# Patient Record
Sex: MALE | Race: WHITE | NOT HISPANIC OR LATINO | Employment: FULL TIME | URBAN - METROPOLITAN AREA
[De-identification: names, ages, dates, MRNs, and addresses within clinical notes are randomized per-mention and may not be internally consistent; named-entity substitution may affect disease eponyms.]

---
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
## 2021-05-13 ENCOUNTER — OFFICE VISIT (OUTPATIENT)
Dept: GASTROENTEROLOGY | Facility: CLINIC | Age: 21
End: 2021-05-13
Payer: COMMERCIAL

## 2021-05-13 VITALS
WEIGHT: 145.2 LBS | SYSTOLIC BLOOD PRESSURE: 120 MMHG | HEIGHT: 71 IN | DIASTOLIC BLOOD PRESSURE: 62 MMHG | HEART RATE: 72 BPM | BODY MASS INDEX: 20.33 KG/M2

## 2021-05-13 DIAGNOSIS — R10.30 LOWER ABDOMINAL PAIN: ICD-10-CM

## 2021-05-13 DIAGNOSIS — R14.1 ABDOMINAL GAS PAIN: ICD-10-CM

## 2021-05-13 DIAGNOSIS — R19.7 DIARRHEA, UNSPECIFIED TYPE: Primary | ICD-10-CM

## 2021-05-13 PROCEDURE — 99203 OFFICE O/P NEW LOW 30 MIN: CPT | Performed by: PHYSICIAN ASSISTANT

## 2021-05-13 RX ORDER — PANTOPRAZOLE SODIUM 40 MG/1
40 TABLET, DELAYED RELEASE ORAL DAILY
Qty: 30 TABLET | Refills: 3 | Status: SHIPPED | OUTPATIENT
Start: 2021-05-13 | End: 2021-07-09

## 2021-05-13 RX ORDER — DIPHENOXYLATE HYDROCHLORIDE AND ATROPINE SULFATE 2.5; .025 MG/1; MG/1
1 TABLET ORAL 4 TIMES DAILY PRN
Qty: 30 TABLET | Refills: 0 | Status: SHIPPED | OUTPATIENT
Start: 2021-05-13 | End: 2021-07-09

## 2021-05-13 NOTE — PROGRESS NOTES
[facility] - Outpatient Consultation  Fernando Warren 22 y o  male MRN: [id number]  Encounter: [id number]          ASSESSMENT AND PLAN:      1  Diarrhea, unspecified type  2  Abdominal gas pain  3  Lower abdominal pain  -Will start patient on Lomotil as needed     -Will start pantoprazole 40 mg daily     -Will plan EGD and colonoscopy with biopsies     -Patient will continue high-fiber diet as well as fiber supplementation     -Patient will continue probiotics daily     -Patient have a follow-up appointment after EGD and colonoscopy   ______________________________________________________________________    HPI:    81-year-old male who presents to the office today for the evaluation of lower abdominal cramping, abdominal gas pain, belching, diarrhea  Patient reports that his the symptoms have been present for several months  Patient unfortunately had his wisdom teeth pulled last year and  Suffered from an ongoing infection for 4 months  Patient was on antibiotics for practically 4 months straight  Patient reports he will have 4-5 bowel movements a day  Patient reports lower abdominal cramping as well as sharp lower abdominal pains  He denies any rectal bleeding or melena he reports his weight is stable and his appetite is good  He is reporting a significant amount of abdominal gas, bloating, belching  Patient has tried over-the-counter Gas-X, Tums, Imodium, acidophilus  Patient does have a family history of irritable bowel syndrome  Patient denies any family history of ulcerative colitis, Crohn's disease  Patient has never had endoscopy or colonoscopy in the past   Patient has had stool testing to include C diff which was negative  REVIEW OF SYSTEMS:    CONSTITUTIONAL: Denies any fever, chills, rigors, and weight loss  HEENT: No earache or tinnitus  Denies hearing loss or visual disturbances  CARDIOVASCULAR: No chest pain or palpitations     RESPIRATORY: Denies any cough, hemoptysis, shortness of breath or dyspnea on exertion  GASTROINTESTINAL: As noted in the History of Present Illness  GENITOURINARY: No problems with urination  Denies any hematuria or dysuria  NEUROLOGIC: No dizziness or vertigo, denies headaches  MUSCULOSKELETAL: Denies any muscle or joint pain  SKIN: Denies skin rashes or itching  ENDOCRINE: Denies excessive thirst  Denies intolerance to heat or cold  PSYCHOSOCIAL: Denies depression or anxiety  Denies any recent memory loss  Historical Information   History reviewed  No pertinent past medical history  Past Surgical History:   Procedure Laterality Date    TONSILLECTOMY      TONSILLECTOMY       Social History   Social History     Substance and Sexual Activity   Alcohol Use No     Social History     Substance and Sexual Activity   Drug Use Never     Social History     Tobacco Use   Smoking Status Never Smoker   Smokeless Tobacco Never Used     Family History   Problem Relation Age of Onset    Heart disease Mother     Cancer Father     Liver disease Brother        Meds/Allergies       Current Outpatient Medications:     ibuprofen (MOTRIN) 400 mg tablet    No Known Allergies        Objective     Blood pressure 120/62, pulse 72, height 5' 11" (1 803 m), weight 65 9 kg (145 lb 3 2 oz)  Body mass index is 20 25 kg/m²  PHYSICAL EXAM:      General Appearance:   Alert, cooperative, no distress   HEENT:   Normocephalic, atraumatic, anicteric      Neck:  Supple, symmetrical, trachea midline   Lungs:   Clear to auscultation bilaterally; no rales, rhonchi or wheezing; respirations unlabored    Heart[de-identified]   Regular rate and rhythm; no murmur, rub, or gallop     Abdomen:   Soft, non-tender, non-distended; normal bowel sounds; no masses, no organomegaly    Genitalia:   Deferred    Rectal:   Deferred    Extremities:  No cyanosis, clubbing or edema    Pulses:  2+ and symmetric    Skin:  No jaundice, rashes, or lesions    Lymph nodes:  No palpable cervical lymphadenopathy        Lab Results:   No visits with results within 1 Day(s) from this visit  Latest known visit with results is:   Appointment on 01/16/2020   Component Date Value    Lyme IgG/IgM Ab 01/16/2020 1 05*    Lyme 18 kD IgG 01/16/2020 Absent     Lyme 23 kD IgG 01/16/2020 Present*    Lyme 28 kD IgG 01/16/2020 Absent     Lyme 30 kD IgG 01/16/2020 Absent     Lyme 39 kD IgG 01/16/2020 Absent     Lyme 41 kD IgG 01/16/2020 Present*    Lyme 45 kD IgG 01/16/2020 Absent     Lyme 58 kD IgG 01/16/2020 Absent     Lyme 66 kD IgG 01/16/2020 Absent     Lyme 93 kD IgG 01/16/2020 Absent     Lyme 23 kD IgM 01/16/2020 Present*    Lyme 39 kD IgM 01/16/2020 Absent     Lyme 41 kD IgM 01/16/2020 Absent     Lyme IgG WB Interp  01/16/2020 Negative     Lyme IgM WB Interp  01/16/2020 Negative          Radiology Results:   No results found

## 2021-05-13 NOTE — PATIENT INSTRUCTIONS
Nutrition Tips for Relief of Diarrhea   WHAT YOU NEED TO KNOW:   There are diet changes you can make to help relieve or stop diarrhea  These changes include limiting or avoiding foods and liquids that are high in sugar, fat, fiber, and lactose  Lactose is a sugar found in milk products  Milk products can cause diarrhea in people who are lactose intolerant  You should also drink extra liquids to replace fluids that are lost when you have diarrhea  Diarrhea can lead to dehydration  DISCHARGE INSTRUCTIONS:   Foods to limit or avoid:   · Dairy:      ? Whole milk    ? Half-and-half, cream, and sour cream    ? Regular (whole milk) ice cream    · Grains:      ? Whole wheat and whole grain breads, pasta, cereals, and crackers    ? [place] and wild rice    ? Breads and cereals with seeds or nuts    ? Popcorn    · Fruit and vegetables:      ? All raw fruits, except bananas and melon    ? Dried fruits, including prunes and raisins    ? Canned fruit in heavy syrup    ? Prune juice and any fruit juice with pulp    ? Raw vegetables, except lettuce     ? Fried vegetables    ? Corn, raw and cooked broccoli, cabbage, cauliflower, and poppy greens    · Protein:      ? Fried meat, poultry, and fish    ? High-fat luncheon meats, such as bologna    ? Fatty meats, such as sausage, flowers, and hot dogs    ? Beans and nuts    · Liquids:      ? Sodas and fruit-flavored drinks    ? Drinks that contain caffeine, such as energy drinks, coffee, and tea     ? Drinks that contain alcohol or sugar alcohol, such as sorbitol    Foods and liquids you may eat or drink:  Most people can tolerate the foods and liquids listed below  If any of them make your symptoms worse, stop eating or drinking them until you feel better  If you are lactose intolerant, avoid milk products  · Dairy:      ? Skim or low-fat milk or evaporated milk    ? Soy milk or buttermilk     ? Low-fat, part-skim, and aged cheese    ?  Yogurt, low-fat ice cream, or sherbert    · Grains:  (Choose foods with less than 2 grams of dietary fiber per serving )     ? White or refined flour breads, bagels, pasta, and crackers    ? Cold or hot cereals made from white or refined flour such as puffed rice, cornflakes, or cream of wheat    ? White rice    · Fruit and vegetables:      ? Bananas or melon    ? Fruit juice without pulp, except prune juice    ? Canned fruit in juice or light syrup    ? Lettuce and most well-cooked vegetables without seeds or skins     ? Strained vegetable juice    · Protein:      ? Tender, well-cooked meat, poultry, or fish    ? Well-cooked eggs or soy foods (cooked without added fat)    ? Smooth nut butters    · Fats:  (Limit fats to less than 8 teaspoons a day)     ? Oil, butter, or margarine, or mayonnaise    ? Cream cheese or salad dressings    · Liquids:      ? For infants, breast milk or formula    ? Oral rehydration solution     ? Decaffeinated coffee or caffeine-free teas    ? Soft drinks without caffeine    Other guidelines to follow:   · Drink liquids as directed  You may need to drink more liquids than usual to prevent dehydration  Ask how much liquid to drink each day and which liquids are best for you  You may need to drink an oral rehydration solution (ORS)  An ORS helps replace fluids and electrolytes that you lose when you have diarrhea  · Eat small meals or snacks every 3 to 4 hours  instead of large meals  Continue eating even if you still have diarrhea  Your diarrhea will continue for a few days but should gradually go away  © Copyright [address] Information is for End User's use only and may not be sold, redistributed or otherwise used for commercial purposes  All illustrations and images included in CareNotes® are the copyrighted property of [employer]  or [facility] Estella Walton   The above information is an  only  It is not intended as medical advice for individual conditions or treatments   Talk to your doctor, nurse or pharmacist before following any medical regimen to see if it is safe and effective for you

## 2021-05-13 NOTE — LETTER
May 13, 2021     Snow Garcia DO  [address]    Patient: Ashanti Galeana   YOB: 2000   Date of Visit: 5/13/2021       Dear Dr Get Salguero: Thank you for referring Ashanti Galeana to me for evaluation  Below are my notes for this consultation  If you have questions, please do not hesitate to call me  I look forward to following your patient along with you           Sincerely,        Neena Nichols PA-C        CC: No Recipients

## 2021-05-24 ENCOUNTER — TELEPHONE (OUTPATIENT)
Dept: GASTROENTEROLOGY | Facility: HOSPITAL | Age: 21
End: 2021-05-24

## 2021-05-28 ENCOUNTER — TELEPHONE (OUTPATIENT)
Dept: GASTROENTEROLOGY | Facility: HOSPITAL | Age: 21
End: 2021-05-28

## 2021-06-01 ENCOUNTER — ANESTHESIA (OUTPATIENT)
Dept: GASTROENTEROLOGY | Facility: HOSPITAL | Age: 21
End: 2021-06-01

## 2021-06-01 ENCOUNTER — TELEPHONE (OUTPATIENT)
Dept: GASTROENTEROLOGY | Facility: HOSPITAL | Age: 21
End: 2021-06-01

## 2021-06-01 ENCOUNTER — ANESTHESIA EVENT (OUTPATIENT)
Dept: GASTROENTEROLOGY | Facility: HOSPITAL | Age: 21
End: 2021-06-01

## 2021-06-01 ENCOUNTER — HOSPITAL ENCOUNTER (OUTPATIENT)
Dept: GASTROENTEROLOGY | Facility: HOSPITAL | Age: 21
Setting detail: OUTPATIENT SURGERY
Discharge: HOME | End: 2021-06-01
Payer: COMMERCIAL

## 2021-06-01 VITALS
DIASTOLIC BLOOD PRESSURE: 54 MMHG | TEMPERATURE: 98 F | WEIGHT: 146.16 LBS | HEIGHT: 66 IN | OXYGEN SATURATION: 100 % | HEART RATE: 100 BPM | RESPIRATION RATE: 18 BRPM | SYSTOLIC BLOOD PRESSURE: 115 MMHG | BODY MASS INDEX: 23.49 KG/M2

## 2021-06-01 DIAGNOSIS — R19.7 DIARRHEA, UNSPECIFIED TYPE: ICD-10-CM

## 2021-06-01 DIAGNOSIS — R14.1 ABDOMINAL GAS PAIN: ICD-10-CM

## 2021-06-01 PROCEDURE — 88305 TISSUE EXAM BY PATHOLOGIST: CPT | Performed by: PATHOLOGY

## 2021-06-01 PROCEDURE — 43239 EGD BIOPSY SINGLE/MULTIPLE: CPT | Performed by: INTERNAL MEDICINE

## 2021-06-01 PROCEDURE — 45380 COLONOSCOPY AND BIOPSY: CPT | Performed by: INTERNAL MEDICINE

## 2021-06-01 PROCEDURE — 88342 IMHCHEM/IMCYTCHM 1ST ANTB: CPT | Performed by: PATHOLOGY

## 2021-06-01 RX ORDER — SODIUM CHLORIDE, SODIUM LACTATE, POTASSIUM CHLORIDE, CALCIUM CHLORIDE 600; 310; 30; 20 MG/100ML; MG/100ML; MG/100ML; MG/100ML
125 INJECTION, SOLUTION INTRAVENOUS CONTINUOUS
Status: DISCONTINUED | OUTPATIENT
Start: 2021-06-01 | End: 2021-06-05 | Stop reason: HOSPADM

## 2021-06-01 RX ORDER — LIDOCAINE HYDROCHLORIDE 20 MG/ML
INJECTION, SOLUTION EPIDURAL; INFILTRATION; INTRACAUDAL; PERINEURAL AS NEEDED
Status: DISCONTINUED | OUTPATIENT
Start: 2021-06-01 | End: 2021-06-01

## 2021-06-01 RX ORDER — SODIUM CHLORIDE, SODIUM LACTATE, POTASSIUM CHLORIDE, CALCIUM CHLORIDE 600; 310; 30; 20 MG/100ML; MG/100ML; MG/100ML; MG/100ML
125 INJECTION, SOLUTION INTRAVENOUS CONTINUOUS
Status: CANCELLED | OUTPATIENT
Start: 2021-06-01

## 2021-06-01 RX ORDER — PROPOFOL 10 MG/ML
INJECTION, EMULSION INTRAVENOUS AS NEEDED
Status: DISCONTINUED | OUTPATIENT
Start: 2021-06-01 | End: 2021-06-01

## 2021-06-01 RX ADMIN — SODIUM CHLORIDE, SODIUM LACTATE, POTASSIUM CHLORIDE, AND CALCIUM CHLORIDE 125 ML/HR: .6; .31; .03; .02 INJECTION, SOLUTION INTRAVENOUS at 10:35

## 2021-06-01 RX ADMIN — PROPOFOL 50 MG: 10 INJECTION, EMULSION INTRAVENOUS at 12:07

## 2021-06-01 RX ADMIN — PROPOFOL 50 MG: 10 INJECTION, EMULSION INTRAVENOUS at 12:02

## 2021-06-01 RX ADMIN — PROPOFOL 150 MG: 10 INJECTION, EMULSION INTRAVENOUS at 11:59

## 2021-06-01 RX ADMIN — LIDOCAINE HYDROCHLORIDE 100 MG: 20 INJECTION, SOLUTION EPIDURAL; INFILTRATION; INTRACAUDAL; PERINEURAL at 11:57

## 2021-06-01 RX ADMIN — PROPOFOL 50 MG: 10 INJECTION, EMULSION INTRAVENOUS at 12:05

## 2021-06-01 RX ADMIN — PROPOFOL 50 MG: 10 INJECTION, EMULSION INTRAVENOUS at 12:11

## 2021-06-01 RX ADMIN — PROPOFOL 50 MG: 10 INJECTION, EMULSION INTRAVENOUS at 12:01

## 2021-06-01 RX ADMIN — PROPOFOL 50 MG: 10 INJECTION, EMULSION INTRAVENOUS at 12:00

## 2021-06-01 NOTE — ANESTHESIA POSTPROCEDURE EVALUATION
Post-Op Assessment Note    CV Status:  Stable  Pain Score: 0    Pain management: adequate     Mental Status:  Awake   Hydration Status:  Stable   PONV Controlled:  Controlled   Airway Patency:  Patent      Post Op Vitals Reviewed: Yes      Staff: CRNA         No complications documented      BP   104/53   Temp      Pulse 87   Resp   11   SpO2   99

## 2021-06-01 NOTE — ANESTHESIA PREPROCEDURE EVALUATION
Procedure:  COLONOSCOPY  EGD    Relevant Problems   No relevant active problems        Physical Exam    Airway    Mallampati score: II  TM Distance: >3 FB  Neck ROM: full     Dental   No notable dental hx     Cardiovascular  Rhythm: regular, Rate: normal, Cardiovascular exam normal    Pulmonary  Pulmonary exam normal Breath sounds clear to auscultation,     Other Findings        Anesthesia Plan  ASA Score- 2     Anesthesia Type- IV sedation with anesthesia with ASA Monitors  Additional Monitors:   Airway Plan:           Plan Factors-Exercise tolerance (METS): >4 METS  Chart reviewed  Patient is not a current smoker  Patient instructed to abstain from smoking on day of procedure  There is medical exclusion for perioperative obstructive sleep apnea risk education  Induction- intravenous  Postoperative Plan-     Informed Consent- Anesthetic plan and risks discussed with patient  I personally reviewed this patient with the CRNA  Discussed and agreed on the Anesthesia Plan with the PAPI Goff

## 2021-06-08 ENCOUNTER — TELEPHONE (OUTPATIENT)
Dept: GASTROENTEROLOGY | Facility: CLINIC | Age: 21
End: 2021-06-08

## 2021-06-08 NOTE — TELEPHONE ENCOUNTER
----- Message from Carlitos Jaime DO sent at 6/7/2021  2:58 PM EDT -----  Please call the patient with the biopsy results  Biopsies the small intestine were benign and negative for celiac disease  Biopsies the stomach were benign and negative for Helicobacter pylori or for cancer  Biopsies of the colon were benign and negative for microscopic colitis

## 2021-06-17 ENCOUNTER — OFFICE VISIT (OUTPATIENT)
Dept: GASTROENTEROLOGY | Facility: CLINIC | Age: 21
End: 2021-06-17
Payer: COMMERCIAL

## 2021-06-17 VITALS
BODY MASS INDEX: 23.78 KG/M2 | HEART RATE: 69 BPM | HEIGHT: 66 IN | SYSTOLIC BLOOD PRESSURE: 128 MMHG | DIASTOLIC BLOOD PRESSURE: 82 MMHG | RESPIRATION RATE: 16 BRPM | WEIGHT: 148 LBS

## 2021-06-17 DIAGNOSIS — K58.0 IRRITABLE BOWEL SYNDROME WITH DIARRHEA: Primary | ICD-10-CM

## 2021-06-17 PROCEDURE — 99213 OFFICE O/P EST LOW 20 MIN: CPT | Performed by: PHYSICIAN ASSISTANT

## 2021-06-17 NOTE — LETTER
June 17, 2021     José Antonio Filter, DO  [address] [place] [id number]    Patient: Patricia iRvas   YOB: 2000   Date of Visit: 6/17/2021       Dear Dr Norma Garcia: Thank you for referring Patricia Rivas to me for evaluation  Below are my notes for this consultation  If you have questions, please do not hesitate to call me  I look forward to following your patient along with you  Sincerely,        Sandy Gutiérrez PA-C        CC: No Recipients  Evelia Seaman  6/17/2021  2:07 PM  Sign when Signing Visit  [facility] - Outpatient Follow-up Note  Patricia Rivas 22 y o  male MRN: [id number]  Encounter: [id number]          ASSESSMENT AND PLAN:      1  Irritable bowel syndrome with diarrhea  -Patient has agreed to start Lomotil therapy     -Patient will continue fiber supplementation and probiotic daily     -Patient will follow-up in 3 months   ______________________________________________________________________    SUBJECTIVE:   25-year-old male who presents for follow-up after his EGD colonoscopy  Patient's endoscopic evaluation was normal   Patient reports he is still feeling the same with 4-5 loose stools a day and abdominal cramping and gas  Unfortunately patient reports to me that he never started taking the Lomotil that was recommended to him  He reports that he is taking fiber supplementation as well as probiotics daily  He denies any alarm symptoms  REVIEW OF SYSTEMS IS OTHERWISE NEGATIVE        Historical Information   Past Medical History:   Diagnosis Date    GERD (gastroesophageal reflux disease)      Past Surgical History:   Procedure Laterality Date    TONSILLECTOMY      TONSILLECTOMY       Social History   Social History     Substance and Sexual Activity   Alcohol Use No     Social History     Substance and Sexual Activity   Drug Use Never     Social History     Tobacco Use   Smoking Status Never Smoker   Smokeless Tobacco Never Used Family History   Problem Relation Age of Onset    Heart disease Mother     Cancer Father     Liver disease Brother        Meds/Allergies       Current Outpatient Medications:     diphenoxylate-atropine (LOMOTIL) 2 5-0 025 mg per tablet    ibuprofen (MOTRIN) 400 mg tablet    pantoprazole (PROTONIX) 40 mg tablet    No Known Allergies        Objective     Blood pressure 128/82, pulse 69, resp  rate 16, height 5' 6" (1 676 m), weight 67 1 kg (148 lb)  Body mass index is 23 89 kg/m²  PHYSICAL EXAM:      General Appearance:   Alert, cooperative, no distress   HEENT:   Normocephalic, atraumatic, anicteric      Neck:  Supple, symmetrical, trachea midline   Lungs:   Clear to auscultation bilaterally; no rales, rhonchi or wheezing; respirations unlabored    Heart[de-identified]   Regular rate and rhythm; no murmur, rub, or gallop  Abdomen:   Soft, non-tender, non-distended; normal bowel sounds; no masses, no organomegaly    Genitalia:   Deferred    Rectal:   Deferred    Extremities:  No cyanosis, clubbing or edema    Pulses:  2+ and symmetric    Skin:  No jaundice, rashes, or lesions    Lymph nodes:  No palpable cervical lymphadenopathy        Lab Results:   No visits with results within 1 Day(s) from this visit     Latest known visit with results is:   Hospital Outpatient Visit on 06/01/2021   Component Date Value    Case Report 06/01/2021                      Value:Surgical Pathology Report                         Case: [id number]                                   Authorizing Provider:  Sahil Howard DO          Collected:           06/01/2021 1203              Ordering Location:      [facility]       Received:            06/01/2021 [address] [place] [address] Endoscopy                                                             Pathologist:           Liane Pemberton MD                                                                Specimens:   A) - Duodenum B) - Stomach                                                                                        C) - Colon, ascending and sigmoid                                                          Final Diagnosis 06/01/2021                      Value: This result contains rich text formatting which cannot be displayed here   Additional Information 06/01/2021                      Value: This result contains rich text formatting which cannot be displayed here  Carina Chávez Gross Description 06/01/2021                      Value: This result contains rich text formatting which cannot be displayed here  Radiology Results:   EGD    Result Date: 6/1/2021  Narrative:  [facility] [address] [facility] [phone] DATE OF SERVICE: 6/01/21 PHYSICIAN(S): Regla Nur DO - Attending Physician INDICATION: Abdominal gas pain POST-OP DIAGNOSIS: See the impression below  PREPROCEDURE: Informed consent was obtained for the procedure, including sedation  Risks of perforation, hemorrhage, adverse drug reaction and aspiration were discussed  The patient was placed in the left lateral decubitus position  Patient was explained about the risks and benefits of the procedure  Risks including but not limited to bleeding, infection, and perforation were explained in detail  Also explained about less than 100% sensitivity with the exam and other alternatives  DETAILS OF PROCEDURE: Patient was taken to the procedure room where a time out was performed to confirm correct patient and correct procedure  The patient underwent monitored anesthesia care, which was administered by an anesthesia professional  The patient's blood pressure, heart rate, level of consciousness, respirations and oxygen were monitored throughout the procedure  The scope was advanced to the second part of the duodenum  Retroflexion was performed in the cardia and fundus   Prior to the procedure, the patient's H  Pylori status was unknown  The patient's estimated blood loss was minimal (<5 mL)  The procedure was not difficult  The patient tolerated the procedure well  There were no apparent complications  ANESTHESIA INFORMATION: ASA: II Anesthesia Type: IV Sedation with Anesthesia FINDINGS: The cricopharynx, upper third of the esophagus, middle third of the esophagus, lower third of the esophagus, GE junction and Z-line appeared normal  Z-line is 40 cm from the incisors  The cardia, fundus of the stomach, body of the stomach, greater curve of the stomach, lesser curve of the stomach, incisura, antrum, prepyloric region and pylorus appeared normal  Performed 4 random biopsies  Rule out Helicobacter pylori  The duodenal bulb and 2nd part of the duodenum appeared normal  Performed 6 random biopsies  Rule out celiac disease  SPECIMENS: ID Type Source Tests Collected by Time Destination 1 :  Tissue Duodenum TISSUE EXAM Adela Pacheco DO 6/1/2021 12:03 PM  4 biopsies were obtained from the antrum to rule out Helicobacter pylori      Impression: 1  Normal esophagogastroduodenoscopy RECOMMENDATION: 1  Will call patient in 1 week regarding the biopsy results  Adela Pacheco DO [place], [place]    Colonoscopy    Result Date: 6/2/2021  Narrative:  [facility] [address] [place] [address] [phone] DATE OF SERVICE: 6/01/21 PHYSICIAN(S): Adela Pacheco DO - Attending Physician INDICATION: Diarrhea, unspecified type Colonoscopy performed for a diagnostic indication  POST-OP DIAGNOSIS: See the impression below  HISTORY: Prior colonoscopy: No prior colonoscopy  BOWEL PREPARATION: Biscodyl tablets;Magnesium/Sodium Sulfate (Miralax, Suprep) PREPROCEDURE: Informed consent was obtained for the procedure, including sedation  Risks including but not limited to bleeding, infection, perforation, adverse drug reaction and aspiration were explained in detail   Also explained about less than 100% sensitivity with the exam and other alternatives  The patient was placed in the left lateral decubitus position  DETAILS OF PROCEDURE: Patient was taken to the procedure room where a time out was performed to confirm correct patient and correct procedure  The patient underwent monitored anesthesia care, which was administered by an anesthesia professional  The patient's blood pressure, heart rate, level of consciousness, oxygen and respirations were monitored throughout the procedure  A digital rectal exam was performed  The scope was introduced through the anus and advanced to the cecum  Photodocumentation was obtained at the ileocecal valve, appendiceal orifice and retroflexed view of the rectum  Retroflexion was performed in the rectum  The quality of bowel preparation was evaluated using the [facility] Bowel Preparation Scale with scores of: right colon = 2, transverse colon = 2, left colon = 2  The total BBPS score was 6  Bowel prep was adequate  The patient's estimated blood loss was minimal (<5 mL)  The procedure was not difficult  The patient tolerated the procedure well  There were no apparent complications  ANESTHESIA INFORMATION: ASA: II Anesthesia Type: IV Sedation with Anesthesia FINDINGS: The cecum, ascending colon, hepatic flexure, transverse colon, splenic flexure, descending colon, sigmoid colon, rectosigmoid and rectum appeared normal  Performed 8 random biopsies  4 biopsies were taken from the ascending colon 4 biopsies were taken from the sigmoid colon to rule out microscopic colitis   EVENTS: Procedure Events Event Event Time ENDO CECUM REACHED 6/1/2021 12:09 PM ENDO SCOPE OUT TIME 6/1/2021 12:15 PM SPECIMENS: ID Type Source Tests Collected by Time Destination 1 :  Tissue Duodenum TISSUE EXAM Alayna Screen, DO 6/1/2021 12:03 PM  2 :  Tissue Stomach TISSUE EXAM Alayna Screen, DO 6/1/2021 12:04 PM  3 : ascending and sigmoid Tissue Colon TISSUE EXAM Alayna Screen, DO 6/1/2021 12:13 PM EQUIPMENT: [id number]     Impression: 1   Normal terminal ileum and normal colon RECOMMENDATION: Repeat in 10 years Call the patient in 1 week to report the results of the biopsy results Waleska Núñez DO, Antoinette Sharpe

## 2021-06-17 NOTE — PATIENT INSTRUCTIONS
Abdominal Pain   WHAT YOU NEED TO KNOW:   Abdominal pain can be dull, achy, or sharp  You may have pain in one area of your abdomen, or in your entire abdomen  Your pain may be caused by a condition such as constipation, food sensitivity or poisoning, infection, or a blockage  Abdominal pain can also be from a hernia, appendicitis, or an ulcer  Liver, gallbladder, or kidney conditions can also cause abdominal pain  The cause of your abdominal pain may be unknown  DISCHARGE INSTRUCTIONS:   Return to the emergency department if:   · You have new chest pain or shortness of breath  · You have pulsing pain in your upper abdomen or lower back that suddenly becomes constant  · Your pain is in the right lower abdominal area and worsens with movement  · You have a fever over 100 4°F (38°C) or shaking chills  · You are vomiting and cannot keep food or liquids down  · Your pain does not improve or gets worse over the next 8 to 12 hours  · You see blood in your vomit or bowel movements, or they look black and tarry  · Your skin or the whites of your eyes turn yellow  · You are a woman and have a large amount of vaginal bleeding that is not your monthly period  Contact your healthcare provider if:   · You have pain in your lower back  · You are a man and have pain in your testicles  · You have pain when you urinate  · You have questions or concerns about your condition or care  Follow up with your healthcare provider within 24 hours or as directed:  Write down your questions so you remember to ask them during your visits  Medicines:   · Medicines  may be given to calm your stomach and prevent vomiting or to decrease pain  Ask how to take pain medicine safely  · Take your medicine as directed  Contact your healthcare provider if you think your medicine is not helping or if you have side effects  Tell him of her if you are allergic to any medicine   Keep a list of the medicines, vitamins, and herbs you take  Include the amounts, and when and why you take them  Bring the list or the pill bottles to follow-up visits  Carry your medicine list with you in case of an emergency  © Copyright [address] Information is for End User's use only and may not be sold, redistributed or otherwise used for commercial purposes  All illustrations and images included in CareNotes® are the copyrighted property of A D A M , Inc  or [facility] Estella Walton   The above information is an  only  It is not intended as medical advice for individual conditions or treatments  Talk to your doctor, nurse or pharmacist before following any medical regimen to see if it is safe and effective for you

## 2021-06-17 NOTE — PROGRESS NOTES
[facility] - Outpatient Follow-up Note  Zac Schooling 22 y o  male MRN: [id number]  Encounter: [id number]          ASSESSMENT AND PLAN:      1  Irritable bowel syndrome with diarrhea  -Patient has agreed to start Lomotil therapy     -Patient will continue fiber supplementation and probiotic daily     -Patient will follow-up in 3 months   ______________________________________________________________________    SUBJECTIVE:   61-year-old male who presents for follow-up after his EGD colonoscopy  Patient's endoscopic evaluation was normal   Patient reports he is still feeling the same with 4-5 loose stools a day and abdominal cramping and gas  Unfortunately patient reports to me that he never started taking the Lomotil that was recommended to him  He reports that he is taking fiber supplementation as well as probiotics daily  He denies any alarm symptoms  REVIEW OF SYSTEMS IS OTHERWISE NEGATIVE  Historical Information   Past Medical History:   Diagnosis Date    GERD (gastroesophageal reflux disease)      Past Surgical History:   Procedure Laterality Date    TONSILLECTOMY      TONSILLECTOMY       Social History   Social History     Substance and Sexual Activity   Alcohol Use No     Social History     Substance and Sexual Activity   Drug Use Never     Social History     Tobacco Use   Smoking Status Never Smoker   Smokeless Tobacco Never Used     Family History   Problem Relation Age of Onset    Heart disease Mother     Cancer Father     Liver disease Brother        Meds/Allergies       Current Outpatient Medications:     diphenoxylate-atropine (LOMOTIL) 2 5-0 025 mg per tablet    ibuprofen (MOTRIN) 400 mg tablet    pantoprazole (PROTONIX) 40 mg tablet    No Known Allergies        Objective     Blood pressure 128/82, pulse 69, resp  rate 16, height 5' 6" (1 676 m), weight 67 1 kg (148 lb)  Body mass index is 23 89 kg/m²        PHYSICAL EXAM:      General Appearance:   Alert, cooperative, no distress   HEENT:   Normocephalic, atraumatic, anicteric      Neck:  Supple, symmetrical, trachea midline   Lungs:   Clear to auscultation bilaterally; no rales, rhonchi or wheezing; respirations unlabored    Heart[de-identified]   Regular rate and rhythm; no murmur, rub, or gallop  Abdomen:   Soft, non-tender, non-distended; normal bowel sounds; no masses, no organomegaly    Genitalia:   Deferred    Rectal:   Deferred    Extremities:  No cyanosis, clubbing or edema    Pulses:  2+ and symmetric    Skin:  No jaundice, rashes, or lesions    Lymph nodes:  No palpable cervical lymphadenopathy        Lab Results:   No visits with results within 1 Day(s) from this visit  Latest known visit with results is:   Hospital Outpatient Visit on 06/01/2021   Component Date Value    Case Report 06/01/2021                      Value:Surgical Pathology Report                         Case: [id number]                                   Authorizing Provider:  Sahil Howard DO          Collected:           06/01/2021 1203              Ordering Location:      [facility]       Received:            06/01/2021 [address] [place] [address] Endoscopy                                                             Pathologist:           Liane Pemberton MD                                                                Specimens:   A) - Duodenum                                                                                       B) - Stomach                                                                                        C) - Colon, ascending and sigmoid                                                          Final Diagnosis 06/01/2021                      Value: This result contains rich text formatting which cannot be displayed here   Additional Information 06/01/2021                      Value: This result contains rich text formatting which cannot be displayed here     Sudhir Zazueta Description 06/01/2021 Value:This result contains rich text formatting which cannot be displayed here  Radiology Results:   EGD    Result Date: 6/1/2021  Narrative:  [id number] [facility] Endoscopy [address] [phone] DATE OF SERVICE: 6/01/21 PHYSICIAN(S): Leela Cruz DO - Attending Physician INDICATION: Abdominal gas pain POST-OP DIAGNOSIS: See the impression below  PREPROCEDURE: Informed consent was obtained for the procedure, including sedation  Risks of perforation, hemorrhage, adverse drug reaction and aspiration were discussed  The patient was placed in the left lateral decubitus position  Patient was explained about the risks and benefits of the procedure  Risks including but not limited to bleeding, infection, and perforation were explained in detail  Also explained about less than 100% sensitivity with the exam and other alternatives  DETAILS OF PROCEDURE: Patient was taken to the procedure room where a time out was performed to confirm correct patient and correct procedure  The patient underwent monitored anesthesia care, which was administered by an anesthesia professional  The patient's blood pressure, heart rate, level of consciousness, respirations and oxygen were monitored throughout the procedure  The scope was advanced to the second part of the duodenum  Retroflexion was performed in the cardia and fundus  Prior to the procedure, the patient's H  Pylori status was unknown  The patient's estimated blood loss was minimal (<5 mL)  The procedure was not difficult  The patient tolerated the procedure well  There were no apparent complications  ANESTHESIA INFORMATION: ASA: II Anesthesia Type: IV Sedation with Anesthesia FINDINGS: The cricopharynx, upper third of the esophagus, middle third of the esophagus, lower third of the esophagus, GE junction and Z-line appeared normal  Z-line is 40 cm from the incisors   The cardia, fundus of the stomach, body of the stomach, greater curve of the stomach, lesser curve of the stomach, incisura, antrum, prepyloric region and pylorus appeared normal  Performed 4 random biopsies  Rule out Helicobacter pylori  The duodenal bulb and 2nd part of the duodenum appeared normal  Performed 6 random biopsies  Rule out celiac disease  SPECIMENS: ID Type Source Tests Collected by Time Destination 1 :  Tissue Duodenum TISSUE EXAM Bashir Hunt DO 6/1/2021 12:03 PM  4 biopsies were obtained from the antrum to rule out Helicobacter pylori      Impression: 1  Normal esophagogastroduodenoscopy RECOMMENDATION: 1  Will call patient in 1 week regarding the biopsy results  Bashir Hunt DO, Gwendel Carmine, [place]    Colonoscopy    Result Date: 6/2/2021  Narrative:  [id number] [facility] Endoscopy [address] [place] [address] [phone] DATE OF SERVICE: 6/01/21 PHYSICIAN(S): Bashir Hunt DO - Attending Physician INDICATION: Diarrhea, unspecified type Colonoscopy performed for a diagnostic indication  POST-OP DIAGNOSIS: See the impression below  HISTORY: Prior colonoscopy: No prior colonoscopy  BOWEL PREPARATION: Biscodyl tablets;Magnesium/Sodium Sulfate (Miralax, Suprep) PREPROCEDURE: Informed consent was obtained for the procedure, including sedation  Risks including but not limited to bleeding, infection, perforation, adverse drug reaction and aspiration were explained in detail  Also explained about less than 100% sensitivity with the exam and other alternatives  The patient was placed in the left lateral decubitus position  DETAILS OF PROCEDURE: Patient was taken to the procedure room where a time out was performed to confirm correct patient and correct procedure  The patient underwent monitored anesthesia care, which was administered by an anesthesia professional  The patient's blood pressure, heart rate, level of consciousness, oxygen and respirations were monitored throughout the procedure  A digital rectal exam was performed   The scope was introduced through the anus and advanced to the cecum  Photodocumentation was obtained at the ileocecal valve, appendiceal orifice and retroflexed view of the rectum  Retroflexion was performed in the rectum  The quality of bowel preparation was evaluated using the [facility] Bowel Preparation Scale with scores of: right colon = 2, transverse colon = 2, left colon = 2  The total BBPS score was 6  Bowel prep was adequate  The patient's estimated blood loss was minimal (<5 mL)  The procedure was not difficult  The patient tolerated the procedure well  There were no apparent complications  ANESTHESIA INFORMATION: ASA: II Anesthesia Type: IV Sedation with Anesthesia FINDINGS: The cecum, ascending colon, hepatic flexure, transverse colon, splenic flexure, descending colon, sigmoid colon, rectosigmoid and rectum appeared normal  Performed 8 random biopsies  4 biopsies were taken from the ascending colon 4 biopsies were taken from the sigmoid colon to rule out microscopic colitis  EVENTS: Procedure Events Event Event Time ENDO CECUM REACHED 6/1/2021 12:09 PM ENDO SCOPE OUT TIME 6/1/2021 12:15 PM SPECIMENS: ID Type Source Tests Collected by Time Destination 1 :  Tissue Duodenum TISSUE EXAM Kwesi Long DO 6/1/2021 12:03 PM  2 :  Tissue Stomach TISSUE EXAM Kwesi Long DO 6/1/2021 12:04 PM  3 : ascending and sigmoid Tissue Colon TISSUE EXAM Kwesi Long DO 6/1/2021 12:13 PM  EQUIPMENT: [id number]     Impression: 1   Normal terminal ileum and normal colon RECOMMENDATION: Repeat in 10 years Call the patient in 1 week to report the results of the biopsy results Kwesi Long DO, Maron Chard

## 2021-07-09 DIAGNOSIS — R14.1 ABDOMINAL GAS PAIN: ICD-10-CM

## 2021-07-09 DIAGNOSIS — R10.30 LOWER ABDOMINAL PAIN: ICD-10-CM

## 2021-07-09 DIAGNOSIS — R19.7 DIARRHEA, UNSPECIFIED TYPE: ICD-10-CM

## 2021-07-09 RX ORDER — DIPHENOXYLATE HYDROCHLORIDE AND ATROPINE SULFATE 2.5; .025 MG/1; MG/1
1 TABLET ORAL 4 TIMES DAILY PRN
Qty: 30 TABLET | Refills: 0 | Status: SHIPPED | OUTPATIENT
Start: 2021-07-09 | End: 2021-08-06 | Stop reason: SDUPTHER

## 2021-07-09 RX ORDER — PANTOPRAZOLE SODIUM 40 MG/1
TABLET, DELAYED RELEASE ORAL
Qty: 90 TABLET | Refills: 1 | Status: SHIPPED | OUTPATIENT
Start: 2021-07-09 | End: 2022-02-03

## 2021-08-06 DIAGNOSIS — R14.1 ABDOMINAL GAS PAIN: ICD-10-CM

## 2021-08-06 DIAGNOSIS — R10.30 LOWER ABDOMINAL PAIN: ICD-10-CM

## 2021-08-06 DIAGNOSIS — R19.7 DIARRHEA, UNSPECIFIED TYPE: ICD-10-CM

## 2021-08-09 RX ORDER — DIPHENOXYLATE HYDROCHLORIDE AND ATROPINE SULFATE 2.5; .025 MG/1; MG/1
1 TABLET ORAL 4 TIMES DAILY PRN
Qty: 30 TABLET | Refills: 0 | Status: SHIPPED | OUTPATIENT
Start: 2021-08-09

## 2021-09-22 ENCOUNTER — OFFICE VISIT (OUTPATIENT)
Dept: GASTROENTEROLOGY | Facility: CLINIC | Age: 21
End: 2021-09-22
Payer: COMMERCIAL

## 2021-09-22 VITALS
WEIGHT: 154 LBS | DIASTOLIC BLOOD PRESSURE: 68 MMHG | BODY MASS INDEX: 24.75 KG/M2 | HEIGHT: 66 IN | HEART RATE: 75 BPM | SYSTOLIC BLOOD PRESSURE: 110 MMHG

## 2021-09-22 DIAGNOSIS — R19.7 DIARRHEA, UNSPECIFIED TYPE: ICD-10-CM

## 2021-09-22 DIAGNOSIS — R14.1 ABDOMINAL GAS PAIN: Primary | ICD-10-CM

## 2021-09-22 PROCEDURE — 99213 OFFICE O/P EST LOW 20 MIN: CPT | Performed by: PHYSICIAN ASSISTANT

## 2021-09-22 RX ORDER — DICYCLOMINE HCL 20 MG
20 TABLET ORAL EVERY 6 HOURS
Qty: 90 TABLET | Refills: 3 | Status: SHIPPED | OUTPATIENT
Start: 2021-09-22 | End: 2021-12-16

## 2021-09-22 NOTE — PATIENT INSTRUCTIONS
Nutrition Tips for Relief of Diarrhea   WHAT YOU NEED TO KNOW:   There are diet changes you can make to help relieve or stop diarrhea  These changes include limiting or avoiding foods and liquids that are high in sugar, fat, fiber, and lactose  Lactose is a sugar found in milk products  Milk products can cause diarrhea in people who are lactose intolerant  You should also drink extra liquids to replace fluids that are lost when you have diarrhea  Diarrhea can lead to dehydration  DISCHARGE INSTRUCTIONS:   Foods to limit or avoid:   · Dairy:      ? Whole milk    ? Half-and-half, cream, and sour cream    ? Regular (whole milk) ice cream    · Grains:      ? Whole wheat and whole grain breads, pasta, cereals, and crackers    ? Cheril Mortimer and wild rice    ? Breads and cereals with seeds or nuts    ? Popcorn    · Fruit and vegetables:      ? All raw fruits, except bananas and melon    ? Dried fruits, including prunes and raisins    ? Canned fruit in heavy syrup    ? Prune juice and any fruit juice with pulp    ? Raw vegetables, except lettuce     ? Fried vegetables    ? Corn, raw and cooked broccoli, cabbage, cauliflower, and poppy greens    · Protein:      ? Fried meat, poultry, and fish    ? High-fat luncheon meats, such as bologna    ? Fatty meats, such as sausage, flowers, and hot dogs    ? Beans and nuts    · Liquids:      ? Sodas and fruit-flavored drinks    ? Drinks that contain caffeine, such as energy drinks, coffee, and tea     ? Drinks that contain alcohol or sugar alcohol, such as sorbitol    Foods and liquids you may eat or drink:  Most people can tolerate the foods and liquids listed below  If any of them make your symptoms worse, stop eating or drinking them until you feel better  If you are lactose intolerant, avoid milk products  · Dairy:      ? Skim or low-fat milk or evaporated milk    ? Soy milk or buttermilk     ? Low-fat, part-skim, and aged cheese    ?  Yogurt, low-fat ice cream, or sherbert    · Grains:  (Choose foods with less than 2 grams of dietary fiber per serving )     ? White or refined flour breads, bagels, pasta, and crackers    ? Cold or hot cereals made from white or refined flour such as puffed rice, cornflakes, or cream of wheat    ? White rice    · Fruit and vegetables:      ? Bananas or melon    ? Fruit juice without pulp, except prune juice    ? Canned fruit in juice or light syrup    ? Lettuce and most well-cooked vegetables without seeds or skins     ? Strained vegetable juice    · Protein:      ? Tender, well-cooked meat, poultry, or fish    ? Well-cooked eggs or soy foods (cooked without added fat)    ? Smooth nut butters    · Fats:  (Limit fats to less than 8 teaspoons a day)     ? Oil, butter, or margarine, or mayonnaise    ? Cream cheese or salad dressings    · Liquids:      ? For infants, breast milk or formula    ? Oral rehydration solution     ? Decaffeinated coffee or caffeine-free teas    ? Soft drinks without caffeine    Other guidelines to follow:   · Drink liquids as directed  You may need to drink more liquids than usual to prevent dehydration  Ask how much liquid to drink each day and which liquids are best for you  You may need to drink an oral rehydration solution (ORS)  An ORS helps replace fluids and electrolytes that you lose when you have diarrhea  · Eat small meals or snacks every 3 to 4 hours  instead of large meals  Continue eating even if you still have diarrhea  Your diarrhea will continue for a few days but should gradually go away  © Copyright [employer] 2021 Information is for End User's use only and may not be sold, redistributed or otherwise used for commercial purposes  All illustrations and images included in CareNotes® are the copyrighted property of [employer]  or [facility] Estella Walton   The above information is an  only  It is not intended as medical advice for individual conditions or treatments   Talk to your doctor, nurse or pharmacist before following any medical regimen to see if it is safe and effective for you

## 2021-09-22 NOTE — LETTER
September 22, 2021     Parmjit Pretty DO  [address]    Patient: Herberth English   YOB: 2000   Date of Visit: 9/22/2021       Dear Dr Juana Sargent: Thank you for referring Herberth English to me for evaluation  Below are my notes for this consultation  If you have questions, please do not hesitate to call me  I look forward to following your patient along with you  Sincerely,        Dayday Trimble PA-C        CC: No Recipients  Evelia Dover  9/22/2021 10:12 AM  Sign when Signing Visit  [facility] - Outpatient Follow-up Note  Herberth English 22 y o  male MRN: [id number]  Encounter: [id number]          ASSESSMENT AND PLAN:      1  Abdominal gas pain  2  Diarrhea, unspecified type  -Will increase Lomotil to 2 pills twice a day  -Will start Bentyl 20 mg 2 to 3 times a day for abdominal discomfort     -Patient will continue fiber and probiotic     -Patient will follow-up in 3 months   ______________________________________________________________________    SUBJECTIVE:    26-year-old male who presents for follow-up of abdominal discomfort and diarrhea  Patient did have a complete GI workup all of which was essentially normal   Patient has been taking Lomotil 1 pill twice a day  Patient still reports 2-3 soft stools a day  Patient does report abdominal gas pain and cramping  Patient is currently taking fiber supplementation and probiotic daily  REVIEW OF SYSTEMS IS OTHERWISE NEGATIVE        Historical Information   Past Medical History:   Diagnosis Date    GERD (gastroesophageal reflux disease)      Past Surgical History:   Procedure Laterality Date    TONSILLECTOMY      TONSILLECTOMY       Social History   Social History     Substance and Sexual Activity   Alcohol Use No     Social History     Substance and Sexual Activity   Drug Use Never     Social History     Tobacco Use   Smoking Status Never Smoker   Smokeless Tobacco Never Used     Family History   Problem Relation Age of Onset    Heart disease Mother     Cancer Father     Liver disease Brother        Meds/Allergies       Current Outpatient Medications:     diphenoxylate-atropine (LOMOTIL) 2 5-0 025 mg per tablet    diphenoxylate-atropine (LOMOTIL) 2 5-0 025 mg per tablet    pantoprazole (PROTONIX) 40 mg tablet    dicyclomine (BENTYL) 20 mg tablet    ibuprofen (MOTRIN) 400 mg tablet    No Known Allergies        Objective     Blood pressure 110/68, pulse 75, height 5' 6" (1 676 m), weight 69 9 kg (154 lb)  Body mass index is 24 86 kg/m²  PHYSICAL EXAM:      General Appearance:   Alert, cooperative, no distress   HEENT:   Normocephalic, atraumatic, anicteric      Neck:  Supple, symmetrical, trachea midline   Lungs:   Clear to auscultation bilaterally; no rales, rhonchi or wheezing; respirations unlabored    Heart[de-identified]   Regular rate and rhythm; no murmur, rub, or gallop  Abdomen:   Soft, non-tender, non-distended; normal bowel sounds; no masses, no organomegaly    Genitalia:   Deferred    Rectal:   Deferred    Extremities:  No cyanosis, clubbing or edema    Pulses:  2+ and symmetric    Skin:  No jaundice, rashes, or lesions    Lymph nodes:  No palpable cervical lymphadenopathy        Lab Results:   No visits with results within 1 Day(s) from this visit     Latest known visit with results is:   Hospital Outpatient Visit on 06/01/2021   Component Date Value    Case Report 06/01/2021                      Value:Surgical Pathology Report                         Case: [id number]                                   Authorizing Provider:  Vince Castro DO          Collected:           06/01/2021 1203              Ordering Location[facility]      [facility]       Received:            06/01/2021 [address] [place] [address] Endoscopy                                                             Pathologist:           Katja Shell MD Specimens:   A) - Duodenum                                                                                       B) - Stomach                                                                                        C) - Colon, ascending and sigmoid                                                          Final Diagnosis 06/01/2021                      Value: This result contains rich text formatting which cannot be displayed here   Additional Information 06/01/2021                      Value: This result contains rich text formatting which cannot be displayed here  Marita Cain Gross Description 06/01/2021                      Value: This result contains rich text formatting which cannot be displayed here  Radiology Results:   No results found

## 2021-09-22 NOTE — PROGRESS NOTES
[facility] - Outpatient Follow-up Note  Federico Alonso 22 y o  male MRN: [id number]  Encounter: [id number]          ASSESSMENT AND PLAN:      1  Abdominal gas pain  2  Diarrhea, unspecified type  -Will increase Lomotil to 2 pills twice a day  -Will start Bentyl 20 mg 2 to 3 times a day for abdominal discomfort     -Patient will continue fiber and probiotic     -Patient will follow-up in 3 months   ______________________________________________________________________    SUBJECTIVE:    44-year-old male who presents for follow-up of abdominal discomfort and diarrhea  Patient did have a complete GI workup all of which was essentially normal   Patient has been taking Lomotil 1 pill twice a day  Patient still reports 2-3 soft stools a day  Patient does report abdominal gas pain and cramping  Patient is currently taking fiber supplementation and probiotic daily  REVIEW OF SYSTEMS IS OTHERWISE NEGATIVE        Historical Information   Past Medical History:   Diagnosis Date    GERD (gastroesophageal reflux disease)      Past Surgical History:   Procedure Laterality Date    TONSILLECTOMY      TONSILLECTOMY       Social History   Social History     Substance and Sexual Activity   Alcohol Use No     Social History     Substance and Sexual Activity   Drug Use Never     Social History     Tobacco Use   Smoking Status Never Smoker   Smokeless Tobacco Never Used     Family History   Problem Relation Age of Onset    Heart disease Mother     Cancer Father     Liver disease Brother        Meds/Allergies       Current Outpatient Medications:     diphenoxylate-atropine (LOMOTIL) 2 5-0 025 mg per tablet    diphenoxylate-atropine (LOMOTIL) 2 5-0 025 mg per tablet    pantoprazole (PROTONIX) 40 mg tablet    dicyclomine (BENTYL) 20 mg tablet    ibuprofen (MOTRIN) 400 mg tablet    No Known Allergies        Objective     Blood pressure 110/68, pulse 75, height 5' 6" (1 676 m), weight 69 9 kg (154 lb)  Body mass index is 24 86 kg/m²  PHYSICAL EXAM:      General Appearance:   Alert, cooperative, no distress   HEENT:   Normocephalic, atraumatic, anicteric      Neck:  Supple, symmetrical, trachea midline   Lungs:   Clear to auscultation bilaterally; no rales, rhonchi or wheezing; respirations unlabored    Heart[de-identified]   Regular rate and rhythm; no murmur, rub, or gallop  Abdomen:   Soft, non-tender, non-distended; normal bowel sounds; no masses, no organomegaly    Genitalia:   Deferred    Rectal:   Deferred    Extremities:  No cyanosis, clubbing or edema    Pulses:  2+ and symmetric    Skin:  No jaundice, rashes, or lesions    Lymph nodes:  No palpable cervical lymphadenopathy        Lab Results:   No visits with results within 1 Day(s) from this visit  Latest known visit with results is:   Hospital Outpatient Visit on 06/01/2021   Component Date Value    Case Report 06/01/2021                      Value:Surgical Pathology Report                         Case: [id number]                                   Authorizing Provider:  Shanice Murphy DO          Collected:           06/01/2021 1203              Ordering Location:      [facility]       Received:            06/01/2021 [address] [place] [address] Endoscopy                                                             Pathologist:           Camron [facility] MD                                                                Specimens:   A) - Duodenum                                                                                       B) - Stomach                                                                                        C) - Colon, ascending and sigmoid                                                          Final Diagnosis 06/01/2021                      Value: This result contains rich text formatting which cannot be displayed here   Additional Information 06/01/2021                      Value: This result contains rich text formatting which cannot be displayed here  Devin Hall Gross Description 06/01/2021                      Value: This result contains rich text formatting which cannot be displayed here  Radiology Results:   No results found

## 2021-11-05 DIAGNOSIS — R10.30 LOWER ABDOMINAL PAIN: ICD-10-CM

## 2021-11-05 DIAGNOSIS — R19.7 DIARRHEA, UNSPECIFIED TYPE: ICD-10-CM

## 2021-11-05 DIAGNOSIS — R14.1 ABDOMINAL GAS PAIN: ICD-10-CM

## 2021-11-05 RX ORDER — DIPHENOXYLATE HYDROCHLORIDE AND ATROPINE SULFATE 2.5; .025 MG/1; MG/1
1 TABLET ORAL 4 TIMES DAILY PRN
Qty: 90 TABLET | Refills: 3 | Status: SHIPPED | OUTPATIENT
Start: 2021-11-05 | End: 2022-04-25

## 2021-12-16 DIAGNOSIS — R14.1 ABDOMINAL GAS PAIN: ICD-10-CM

## 2021-12-16 RX ORDER — DICYCLOMINE HCL 20 MG
20 TABLET ORAL EVERY 6 HOURS
Qty: 90 TABLET | Refills: 3 | Status: SHIPPED | OUTPATIENT
Start: 2021-12-16 | End: 2022-03-09

## 2022-02-03 DIAGNOSIS — R14.1 ABDOMINAL GAS PAIN: ICD-10-CM

## 2022-02-03 DIAGNOSIS — R10.30 LOWER ABDOMINAL PAIN: ICD-10-CM

## 2022-02-03 DIAGNOSIS — R19.7 DIARRHEA, UNSPECIFIED TYPE: ICD-10-CM

## 2022-02-03 RX ORDER — PANTOPRAZOLE SODIUM 40 MG/1
TABLET, DELAYED RELEASE ORAL
Qty: 90 TABLET | Refills: 1 | Status: SHIPPED | OUTPATIENT
Start: 2022-02-03 | End: 2022-06-19

## 2022-03-09 DIAGNOSIS — R14.1 ABDOMINAL GAS PAIN: ICD-10-CM

## 2022-03-09 RX ORDER — DICYCLOMINE HCL 20 MG
TABLET ORAL
Qty: 90 TABLET | Refills: 3 | Status: SHIPPED | OUTPATIENT
Start: 2022-03-09 | End: 2022-06-12

## 2022-04-25 DIAGNOSIS — R14.1 ABDOMINAL GAS PAIN: ICD-10-CM

## 2022-04-25 DIAGNOSIS — R10.30 LOWER ABDOMINAL PAIN: ICD-10-CM

## 2022-04-25 DIAGNOSIS — R19.7 DIARRHEA, UNSPECIFIED TYPE: ICD-10-CM

## 2022-04-25 RX ORDER — DIPHENOXYLATE HYDROCHLORIDE AND ATROPINE SULFATE 2.5; .025 MG/1; MG/1
TABLET ORAL
Qty: 90 TABLET | Refills: 3 | Status: SHIPPED | OUTPATIENT
Start: 2022-04-25 | End: 2022-08-03

## 2022-06-12 DIAGNOSIS — R14.1 ABDOMINAL GAS PAIN: ICD-10-CM

## 2022-06-12 RX ORDER — DICYCLOMINE HCL 20 MG
TABLET ORAL
Qty: 90 TABLET | Refills: 3 | Status: SHIPPED | OUTPATIENT
Start: 2022-06-12

## 2022-06-19 DIAGNOSIS — R10.30 LOWER ABDOMINAL PAIN: ICD-10-CM

## 2022-06-19 DIAGNOSIS — R14.1 ABDOMINAL GAS PAIN: ICD-10-CM

## 2022-06-19 DIAGNOSIS — R19.7 DIARRHEA, UNSPECIFIED TYPE: ICD-10-CM

## 2022-06-19 RX ORDER — PANTOPRAZOLE SODIUM 40 MG/1
TABLET, DELAYED RELEASE ORAL
Qty: 90 TABLET | Refills: 1 | Status: SHIPPED | OUTPATIENT
Start: 2022-06-19

## 2022-08-03 DIAGNOSIS — R14.1 ABDOMINAL GAS PAIN: ICD-10-CM

## 2022-08-03 DIAGNOSIS — R19.7 DIARRHEA, UNSPECIFIED TYPE: ICD-10-CM

## 2022-08-03 DIAGNOSIS — R10.30 LOWER ABDOMINAL PAIN: ICD-10-CM

## 2022-08-03 RX ORDER — DIPHENOXYLATE HYDROCHLORIDE AND ATROPINE SULFATE 2.5; .025 MG/1; MG/1
TABLET ORAL
Qty: 90 TABLET | Refills: 3 | Status: SHIPPED | OUTPATIENT
Start: 2022-08-03 | End: 2022-10-24

## 2022-09-10 DIAGNOSIS — R14.1 ABDOMINAL GAS PAIN: ICD-10-CM

## 2022-09-10 RX ORDER — DICYCLOMINE HCL 20 MG
TABLET ORAL
Qty: 90 TABLET | Refills: 3 | Status: SHIPPED | OUTPATIENT
Start: 2022-09-10

## 2022-10-24 DIAGNOSIS — R10.30 LOWER ABDOMINAL PAIN: ICD-10-CM

## 2022-10-24 DIAGNOSIS — R14.1 ABDOMINAL GAS PAIN: ICD-10-CM

## 2022-10-24 DIAGNOSIS — R19.7 DIARRHEA, UNSPECIFIED TYPE: ICD-10-CM

## 2022-10-24 RX ORDER — DIPHENOXYLATE HYDROCHLORIDE AND ATROPINE SULFATE 2.5; .025 MG/1; MG/1
TABLET ORAL
Qty: 90 TABLET | Refills: 1 | Status: SHIPPED | OUTPATIENT
Start: 2022-10-24

## 2022-12-06 DIAGNOSIS — R10.30 LOWER ABDOMINAL PAIN: ICD-10-CM

## 2022-12-06 DIAGNOSIS — R19.7 DIARRHEA, UNSPECIFIED TYPE: ICD-10-CM

## 2022-12-06 DIAGNOSIS — R14.1 ABDOMINAL GAS PAIN: ICD-10-CM

## 2022-12-06 RX ORDER — DIPHENOXYLATE HYDROCHLORIDE AND ATROPINE SULFATE 2.5; .025 MG/1; MG/1
TABLET ORAL
Qty: 90 TABLET | Refills: 3 | Status: SHIPPED | OUTPATIENT
Start: 2022-12-06

## 2022-12-12 DIAGNOSIS — R10.30 LOWER ABDOMINAL PAIN: ICD-10-CM

## 2022-12-12 DIAGNOSIS — R19.7 DIARRHEA, UNSPECIFIED TYPE: ICD-10-CM

## 2022-12-12 DIAGNOSIS — R14.1 ABDOMINAL GAS PAIN: ICD-10-CM

## 2022-12-12 RX ORDER — PANTOPRAZOLE SODIUM 40 MG/1
TABLET, DELAYED RELEASE ORAL
Qty: 90 TABLET | Refills: 1 | Status: SHIPPED | OUTPATIENT
Start: 2022-12-12

## 2023-01-03 ENCOUNTER — TELEPHONE (OUTPATIENT)
Dept: GASTROENTEROLOGY | Facility: CLINIC | Age: 23
End: 2023-01-03

## 2023-01-03 NOTE — TELEPHONE ENCOUNTER
Patients GI provider:  Kamini Colby    Number to return call: [phone](QYT)    Reason for call: Pt's dad calling, states refill for Bentyl did not get to pharmacy  They gave enough medications for a few days  Chart say "failed [facility]"   Please correct or call dad to discuss      Scheduled procedure/appointment date if applicable: Apt/procedure NA

## 2023-01-26 DIAGNOSIS — R14.1 ABDOMINAL GAS PAIN: ICD-10-CM

## 2023-01-26 RX ORDER — DICYCLOMINE HCL 20 MG
TABLET ORAL
Qty: 360 TABLET | Refills: 1 | Status: SHIPPED | OUTPATIENT
Start: 2023-01-26

## 2023-01-27 RX ORDER — TRAZODONE HYDROCHLORIDE 100 MG/1
TABLET ORAL
COMMUNITY
Start: 2022-11-26

## 2023-01-31 ENCOUNTER — OFFICE VISIT (OUTPATIENT)
Dept: GASTROENTEROLOGY | Facility: CLINIC | Age: 23
End: 2023-01-31

## 2023-01-31 VITALS
HEIGHT: 67 IN | HEART RATE: 84 BPM | WEIGHT: 164 LBS | BODY MASS INDEX: 25.74 KG/M2 | SYSTOLIC BLOOD PRESSURE: 138 MMHG | DIASTOLIC BLOOD PRESSURE: 78 MMHG | OXYGEN SATURATION: 99 %

## 2023-01-31 DIAGNOSIS — K58.0 IRRITABLE BOWEL SYNDROME WITH DIARRHEA: Primary | ICD-10-CM

## 2023-01-31 DIAGNOSIS — K21.9 GASTROESOPHAGEAL REFLUX DISEASE WITHOUT ESOPHAGITIS: ICD-10-CM

## 2023-01-31 NOTE — LETTER
January 31, 2023     Noni Moses DO  [facility]  [address] [place] [address]    Patient: Danish Castelan   YOB: 2000   Date of Visit: 1/31/2023       Dear Dr Perfecto Davis: Thank you for referring Danish Castelan to me for evaluation  Below are my notes for this consultation  If you have questions, please do not hesitate to call me  I look forward to following your patient along with you  Sincerely,        Meghann Vargas PA-C        CC: No Recipients  Meghann Vargas PA-C  1/31/2023  1:20 PM  Sign when Signing Visit  Answers for HPI/ROS submitted by the patient on 1/30/2023  Chronicity: recurrent  Onset: more than 1 year ago  Onset quality: sudden  Frequency: intermittently  Episode duration: 1 Hours  Progression since onset: waxing and waning  Pain location: periumbilical region, suprapubic region  Pain - numeric: 3/10  Pain quality: cramping, sharp  Radiates to: does not radiate  anorexia: No  arthralgias: No  belching: Yes  constipation: No  diarrhea: Yes  dysuria: No  fever: No  flatus: No  frequency: No  headaches: No  hematochezia: No  hematuria: No  melena: No  myalgias: No  nausea: No  weight loss: No  vomiting: No  Aggravated by: nothing  Relieved by: bowel movements, passing flatus    [facility] - Outpatient Follow-up Note  Danish Castelan 25 y o  male MRN: [id number]  Encounter: [id number]          ASSESSMENT AND PLAN:      1  Irritable bowel syndrome with diarrhea  2  Gastroesophageal reflux disease without esophagitis  -At this time we will continue dicyclomine, Lomotil, pantoprazole   -No plans for any repeat EGD or colonoscopy at this time     -Patient will call the office with any return of alarm symptoms  ______________________________________________________________________    SUBJECTIVE:    60-year-old male with a past medical history significant for irritable bowel syndrome with diarrhea and gastroesophageal reflux disease   Overall patient is doing quite well  He is currently maintained on dicyclomine, Lomotil, pantoprazole  He reports significant improvement in his abdominal pain  He reports that he is having 2-3 semiformed stools a day  He denies any melena or rectal bleeding  His weight is stable  He has a good appetite  Patient did undergo an upper endoscopy as well as a colonoscopy in 2021  Both of these examinations were unremarkable  REVIEW OF SYSTEMS IS OTHERWISE NEGATIVE  Historical Information   Past Medical History:   Diagnosis Date   • GERD (gastroesophageal reflux disease)      Past Surgical History:   Procedure Laterality Date   • TONSILLECTOMY     • TONSILLECTOMY       Social History   Social History     Substance and Sexual Activity   Alcohol Use No     Social History     Substance and Sexual Activity   Drug Use Never     Social History     Tobacco Use   Smoking Status Never   Smokeless Tobacco Never     Family History   Problem Relation Age of Onset   • Heart disease Mother    • Cancer Father    • Liver disease Brother        Meds/Allergies        Current Outpatient Medications:   •  dicyclomine (BENTYL) 20 mg tablet  •  diphenoxylate-atropine (LOMOTIL) 2 5-0 025 mg per tablet  •  pantoprazole (PROTONIX) 40 mg tablet  •  traZODone (DESYREL) 100 mg tablet    No Known Allergies        Objective      Blood pressure 138/78, pulse 84, height 5' 7" (1 702 m), weight 74 4 kg (164 lb), SpO2 99 %  Body mass index is 25 69 kg/m²  PHYSICAL EXAM:      General Appearance:   Alert, cooperative, no distress   HEENT:   Normocephalic, atraumatic, anicteric      Neck:  Supple, symmetrical, trachea midline   Lungs:   Clear to auscultation bilaterally; no rales, rhonchi or wheezing; respirations unlabored    Heart[de-identified]   Regular rate and rhythm; no murmur, rub, or gallop     Abdomen:   Soft, non-tender, non-distended; normal bowel sounds; no masses, no organomegaly    Genitalia:   Deferred    Rectal:   Deferred    Extremities:  No cyanosis, clubbing or edema    Pulses:  2+ and symmetric    Skin:  No jaundice, rashes, or lesions    Lymph nodes:  No palpable cervical lymphadenopathy        Lab Results:   No visits with results within 1 Day(s) from this visit  Latest known visit with results is:   Hospital Outpatient Visit on 06/01/2021   Component Date Value   • Case Report 06/01/2021                      Value:Surgical Pathology Report                         Case: [id number]                                   Authorizing Provider:  Mari Best DO          Collected:           06/01/2021 1203              Ordering Location:      [facility]       Received:            06/01/2021 [address] [place] [address] Endoscopy                                                             Pathologist:           Ralph Monroy MD                                                                Specimens:   A) - Duodenum                                                                                       B) - Stomach                                                                                        C) - Colon, ascending and sigmoid                                                         • Final Diagnosis 06/01/2021                      Value: This result contains rich text formatting which cannot be displayed here  • Additional Information 06/01/2021                      Value: This result contains rich text formatting which cannot be displayed here  • Gross Description 06/01/2021                      Value: This result contains rich text formatting which cannot be displayed here  Radiology Results:   No results found

## 2023-01-31 NOTE — PROGRESS NOTES
Answers for HPI/ROS submitted by the patient on 1/30/2023  Chronicity: recurrent  Onset: more than 1 year ago  Onset quality: sudden  Frequency: intermittently  Episode duration: 1 Hours  Progression since onset: waxing and waning  Pain location: periumbilical region, suprapubic region  Pain - numeric: 3/10  Pain quality: cramping, sharp  Radiates to: does not radiate  anorexia: No  arthralgias: No  belching: Yes  constipation: No  diarrhea: Yes  dysuria: No  fever: No  flatus: No  frequency: No  headaches: No  hematochezia: No  hematuria: No  melena: No  myalgias: No  nausea: No  weight loss: No  vomiting: No  Aggravated by: nothing  Relieved by: bowel movements, passing flatus    [facility] - Outpatient Follow-up Note  Cari Chele 25 y o  male MRN: [id number]  Encounter: [id number]          ASSESSMENT AND PLAN:      1  Irritable bowel syndrome with diarrhea  2  Gastroesophageal reflux disease without esophagitis  -At this time we will continue dicyclomine, Lomotil, pantoprazole   -No plans for any repeat EGD or colonoscopy at this time     -Patient will call the office with any return of alarm symptoms  ______________________________________________________________________    SUBJECTIVE:    43-year-old male with a past medical history significant for irritable bowel syndrome with diarrhea and gastroesophageal reflux disease  Overall patient is doing quite well  He is currently maintained on dicyclomine, Lomotil, pantoprazole  He reports significant improvement in his abdominal pain  He reports that he is having 2-3 semiformed stools a day  He denies any melena or rectal bleeding  His weight is stable  He has a good appetite  Patient did undergo an upper endoscopy as well as a colonoscopy in 2021  Both of these examinations were unremarkable  REVIEW OF SYSTEMS IS OTHERWISE NEGATIVE        Historical Information   Past Medical History:   Diagnosis Date   • GERD (gastroesophageal reflux disease)      Past Surgical History:   Procedure Laterality Date   • TONSILLECTOMY     • TONSILLECTOMY       Social History   Social History     Substance and Sexual Activity   Alcohol Use No     Social History     Substance and Sexual Activity   Drug Use Never     Social History     Tobacco Use   Smoking Status Never   Smokeless Tobacco Never     Family History   Problem Relation Age of Onset   • Heart disease Mother    • Cancer Father    • Liver disease Brother        Meds/Allergies       Current Outpatient Medications:   •  dicyclomine (BENTYL) 20 mg tablet  •  diphenoxylate-atropine (LOMOTIL) 2 5-0 025 mg per tablet  •  pantoprazole (PROTONIX) 40 mg tablet  •  traZODone (DESYREL) 100 mg tablet    No Known Allergies        Objective     Blood pressure 138/78, pulse 84, height 5' 7" (1 702 m), weight 74 4 kg (164 lb), SpO2 99 %  Body mass index is 25 69 kg/m²  PHYSICAL EXAM:      General Appearance:   Alert, cooperative, no distress   HEENT:   Normocephalic, atraumatic, anicteric      Neck:  Supple, symmetrical, trachea midline   Lungs:   Clear to auscultation bilaterally; no rales, rhonchi or wheezing; respirations unlabored    Heart[de-identified]   Regular rate and rhythm; no murmur, rub, or gallop  Abdomen:   Soft, non-tender, non-distended; normal bowel sounds; no masses, no organomegaly    Genitalia:   Deferred    Rectal:   Deferred    Extremities:  No cyanosis, clubbing or edema    Pulses:  2+ and symmetric    Skin:  No jaundice, rashes, or lesions    Lymph nodes:  No palpable cervical lymphadenopathy        Lab Results:   No visits with results within 1 Day(s) from this visit     Latest known visit with results is:   Hospital Outpatient Visit on 06/01/2021   Component Date Value   • Case Report 06/01/2021                      Value:Surgical Pathology Report                         Case: [id number]                                   Authorizing Provider:  Tianna Fowler DO Collected:           06/01/2021 1203              Ordering Location:      [facility]       Received:            06/01/2021 [address] [place] [address] Endoscopy                                                             Pathologist:           Elizabeth Mancera MD                                                                Specimens:   A) - Duodenum                                                                                       B) - Stomach                                                                                        C) - Colon, ascending and sigmoid                                                         • Final Diagnosis 06/01/2021                      Value: This result contains rich text formatting which cannot be displayed here  • Additional Information 06/01/2021                      Value: This result contains rich text formatting which cannot be displayed here  • Gross Description 06/01/2021                      Value: This result contains rich text formatting which cannot be displayed here  Radiology Results:   No results found

## 2023-05-21 DIAGNOSIS — R14.1 ABDOMINAL GAS PAIN: ICD-10-CM

## 2023-05-21 RX ORDER — DICYCLOMINE HCL 20 MG
TABLET ORAL
Qty: 360 TABLET | Refills: 1 | Status: SHIPPED | OUTPATIENT
Start: 2023-05-21

## 2023-09-17 DIAGNOSIS — R19.7 DIARRHEA, UNSPECIFIED TYPE: ICD-10-CM

## 2023-09-17 DIAGNOSIS — R10.30 LOWER ABDOMINAL PAIN: ICD-10-CM

## 2023-09-17 DIAGNOSIS — R14.1 ABDOMINAL GAS PAIN: ICD-10-CM

## 2023-09-18 RX ORDER — PANTOPRAZOLE SODIUM 40 MG/1
TABLET, DELAYED RELEASE ORAL
Qty: 90 TABLET | Refills: 1 | Status: SHIPPED | OUTPATIENT
Start: 2023-09-18

## 2024-01-16 DIAGNOSIS — R14.1 ABDOMINAL GAS PAIN: ICD-10-CM

## 2024-01-16 RX ORDER — DICYCLOMINE HCL 20 MG
TABLET ORAL
Qty: 360 TABLET | Refills: 1 | Status: SHIPPED | OUTPATIENT
Start: 2024-01-16

## 2024-02-01 DIAGNOSIS — R10.30 LOWER ABDOMINAL PAIN: ICD-10-CM

## 2024-02-01 DIAGNOSIS — R14.1 ABDOMINAL GAS PAIN: ICD-10-CM

## 2024-02-01 DIAGNOSIS — R19.7 DIARRHEA, UNSPECIFIED TYPE: ICD-10-CM

## 2024-02-01 RX ORDER — PANTOPRAZOLE SODIUM 40 MG/1
TABLET, DELAYED RELEASE ORAL
Qty: 90 TABLET | Refills: 1 | Status: SHIPPED | OUTPATIENT
Start: 2024-02-01

## 2024-06-20 ENCOUNTER — HOSPITAL ENCOUNTER (EMERGENCY)
Facility: HOSPITAL | Age: 24
Discharge: HOME | End: 2024-06-20
Attending: EMERGENCY MEDICINE
Payer: COMMERCIAL

## 2024-06-20 VITALS
HEART RATE: 98 BPM | SYSTOLIC BLOOD PRESSURE: 157 MMHG | DIASTOLIC BLOOD PRESSURE: 84 MMHG | RESPIRATION RATE: 16 BRPM | TEMPERATURE: 97.3 F | OXYGEN SATURATION: 100 %

## 2024-06-20 DIAGNOSIS — S05.02XA ABRASION OF LEFT CORNEA, INITIAL ENCOUNTER: Primary | ICD-10-CM

## 2024-06-20 PROCEDURE — 99283 EMERGENCY DEPT VISIT LOW MDM: CPT

## 2024-06-20 PROCEDURE — 99284 EMERGENCY DEPT VISIT MOD MDM: CPT | Performed by: EMERGENCY MEDICINE

## 2024-06-20 RX ORDER — POLYMYXIN B SULFATE AND TRIMETHOPRIM 1; 10000 MG/ML; [USP'U]/ML
1 SOLUTION OPHTHALMIC EVERY 4 HOURS
Qty: 10 ML | Refills: 0 | Status: SHIPPED | OUTPATIENT
Start: 2024-06-20

## 2024-06-20 RX ORDER — TETRACAINE HYDROCHLORIDE 5 MG/ML
1 SOLUTION OPHTHALMIC ONCE
Status: COMPLETED | OUTPATIENT
Start: 2024-06-20 | End: 2024-06-20

## 2024-06-20 RX ADMIN — FLUORESCEIN SODIUM 1 STRIP: 1 STRIP OPHTHALMIC at 21:06

## 2024-06-20 RX ADMIN — TETRACAINE HYDROCHLORIDE 1 DROP: 5 SOLUTION OPHTHALMIC at 21:06

## 2024-06-21 NOTE — ED PROVIDER NOTES
History  Chief Complaint   Patient presents with    Eye Pain     Pt thinks he has something in his left eye.      24-year-old male, presents with left eye pain.  Patient states he was throwing a stick earlier today, a piece of it came off and got in his left eye.  Reports pain in left eye since, pain improved while in the waiting room.  No visual changes, patient does not wear contact lenses.      History provided by:  Patient   used: No    Eye Pain      Prior to Admission Medications   Prescriptions Last Dose Informant Patient Reported? Taking?   dicyclomine (BENTYL) 20 mg tablet   No No   Sig: TAKE 1 TABLET BY MOUTH EVERY 6 HOURS   diphenoxylate-atropine (LOMOTIL) 2.5-0.025 mg per tablet   No No   Sig: TAKE 1 TABLET BY MOUTH 4 TIMES A DAY AS NEEDED FOR DIARRHEA   pantoprazole (PROTONIX) 40 mg tablet   No No   Sig: TAKE 1 TABLET BY MOUTH EVERY DAY   traZODone (DESYREL) 100 mg tablet   Yes No   Sig: TAKE 1 TABLET BY MOUTH EVERY DAY AT NIGHT      Facility-Administered Medications: None       Past Medical History:   Diagnosis Date    GERD (gastroesophageal reflux disease)        Past Surgical History:   Procedure Laterality Date    TONSILLECTOMY      TONSILLECTOMY         Family History   Problem Relation Age of Onset    Heart disease Mother     Cancer Father     Liver disease Brother      I have reviewed and agree with the history as documented.    E-Cigarette/Vaping    E-Cigarette Use Never User      E-Cigarette/Vaping Substances    Nicotine No     THC No     CBD No     Flavoring No     Other No     Unknown No      Social History     Tobacco Use    Smoking status: Never    Smokeless tobacco: Never   Vaping Use    Vaping status: Never Used   Substance Use Topics    Alcohol use: No    Drug use: Never       Review of Systems   Constitutional: Negative.    Eyes:  Positive for pain. Negative for visual disturbance.   Neurological: Negative.        Physical Exam  Physical Exam  Vitals and nursing note  reviewed.   Constitutional:       General: He is not in acute distress.  HENT:      Head: Normocephalic and atraumatic.      Comments: No facial swelling or erythema  Eyes:      General: Lids are normal. Lids are everted, no foreign bodies appreciated. Vision grossly intact.         Right eye: No foreign body.         Left eye: No foreign body.      Extraocular Movements: Extraocular movements intact.      Conjunctiva/sclera:      Left eye: Left conjunctiva is injected.      Pupils: Pupils are equal, round, and reactive to light.      Left eye: Corneal abrasion and fluorescein uptake present.        Comments: Corneal abrasion noted to left eye medial to pupil, no foreign body   Cardiovascular:      Rate and Rhythm: Normal rate.   Pulmonary:      Effort: Pulmonary effort is normal.   Neurological:      General: No focal deficit present.      Mental Status: He is alert and oriented to person, place, and time.         Vital Signs  ED Triage Vitals [06/20/24 2037]   Temperature Pulse Respirations Blood Pressure SpO2   (!) 97.3 °F (36.3 °C) (!) 124 16 157/84 100 %      Temp Source Heart Rate Source Patient Position - Orthostatic VS BP Location FiO2 (%)   Temporal Monitor Sitting Left arm --      Pain Score       --           Vitals:    06/20/24 2037 06/20/24 2107   BP: 157/84    Pulse: (!) 124 98   Patient Position - Orthostatic VS: Sitting          Visual Acuity      ED Medications  Medications   tetracaine 0.5 % ophthalmic solution 1 drop (1 drop Both Eyes Given 6/20/24 2106)   fluorescein sodium sterile ophthalmic strip 1 strip (1 strip Both Eyes Given 6/20/24 2106)       Diagnostic Studies  Results Reviewed       None                   No orders to display              Procedures  Procedures         ED Course                               SBIRT 22yo+      Flowsheet Row Most Recent Value   Initial Alcohol Screen: US AUDIT-C     1. How often do you have a drink containing alcohol? 0 Filed at: 06/20/2024 2117   2. How  many drinks containing alcohol do you have on a typical day you are drinking?  0 Filed at: 06/20/2024 2117   3a. Male UNDER 65: How often do you have five or more drinks on one occasion? 0 Filed at: 06/20/2024 2117   3b. FEMALE Any Age, or MALE 65+: How often do you have 4 or more drinks on one occassion? 0 Filed at: 06/20/2024 2117   Audit-C Score 0 Filed at: 06/20/2024 2117   JAS: How many times in the past year have you...    Used an illegal drug or used a prescription medication for non-medical reasons? Never Filed at: 06/20/2024 2117                      Medical Decision Making  24-year-old male, presents with left eye pain.  Differential diagnosis includes foreign body, corneal abrasion, conjunctivitis among other diagnoses.  Patient noted to have corneal abrasion on exam, no foreign bodies.  Antibiotic drops prescribed, patient instructed to follow-up or return for any worsening or new concerning symptoms.    Risk  Prescription drug management.             Disposition  Final diagnoses:   Abrasion of left cornea, initial encounter     Time reflects when diagnosis was documented in both MDM as applicable and the Disposition within this note       Time User Action Codes Description Comment    6/20/2024  9:22 PM Marshal Stark Add [S05.02XA] Abrasion of left cornea, initial encounter           ED Disposition       ED Disposition   Discharge    Condition   Stable    Date/Time   Thu Jun 20, 2024 2122    Comment   Kishor Talbot discharge to home/self care.                   Follow-up Information    None         Discharge Medication List as of 6/20/2024  9:23 PM        START taking these medications    Details   polymyxin b-trimethoprim (POLYTRIM) ophthalmic solution Administer 1 drop into the left eye every 4 (four) hours, Starting Thu 6/20/2024, Normal           CONTINUE these medications which have NOT CHANGED    Details   dicyclomine (BENTYL) 20 mg tablet TAKE 1 TABLET BY MOUTH EVERY 6 HOURS, Normal       diphenoxylate-atropine (LOMOTIL) 2.5-0.025 mg per tablet TAKE 1 TABLET BY MOUTH 4 TIMES A DAY AS NEEDED FOR DIARRHEA, Normal      pantoprazole (PROTONIX) 40 mg tablet TAKE 1 TABLET BY MOUTH EVERY DAY, Normal      traZODone (DESYREL) 100 mg tablet TAKE 1 TABLET BY MOUTH EVERY DAY AT NIGHT, Historical Med             No discharge procedures on file.    PDMP Review       None            ED Provider  Electronically Signed by             Marshal Stark MD  06/20/24 1437

## 2024-07-17 DIAGNOSIS — R14.1 ABDOMINAL GAS PAIN: ICD-10-CM

## 2024-07-17 RX ORDER — DICYCLOMINE HCL 20 MG
TABLET ORAL
Qty: 120 TABLET | Refills: 0 | Status: SHIPPED | OUTPATIENT
Start: 2024-07-17

## 2024-07-29 DIAGNOSIS — R10.30 LOWER ABDOMINAL PAIN: ICD-10-CM

## 2024-07-29 DIAGNOSIS — R19.7 DIARRHEA, UNSPECIFIED TYPE: ICD-10-CM

## 2024-07-29 DIAGNOSIS — R14.1 ABDOMINAL GAS PAIN: ICD-10-CM

## 2024-07-30 RX ORDER — PANTOPRAZOLE SODIUM 40 MG/1
TABLET, DELAYED RELEASE ORAL
Qty: 30 TABLET | Refills: 0 | Status: SHIPPED | OUTPATIENT
Start: 2024-07-30

## 2024-08-12 DIAGNOSIS — R14.1 ABDOMINAL GAS PAIN: ICD-10-CM

## 2024-08-12 RX ORDER — DICYCLOMINE HCL 20 MG
TABLET ORAL
Qty: 360 TABLET | Refills: 0 | Status: SHIPPED | OUTPATIENT
Start: 2024-08-12

## 2024-09-28 DIAGNOSIS — R10.30 LOWER ABDOMINAL PAIN: ICD-10-CM

## 2024-09-28 DIAGNOSIS — R14.1 ABDOMINAL GAS PAIN: ICD-10-CM

## 2024-09-28 DIAGNOSIS — R19.7 DIARRHEA, UNSPECIFIED TYPE: ICD-10-CM

## 2024-09-30 RX ORDER — PANTOPRAZOLE SODIUM 40 MG/1
TABLET, DELAYED RELEASE ORAL
Qty: 90 TABLET | Refills: 1 | Status: SHIPPED | OUTPATIENT
Start: 2024-09-30

## 2024-11-12 DIAGNOSIS — R14.1 ABDOMINAL GAS PAIN: ICD-10-CM

## 2024-11-12 RX ORDER — DICYCLOMINE HCL 20 MG
TABLET ORAL
Qty: 360 TABLET | Refills: 0 | Status: SHIPPED | OUTPATIENT
Start: 2024-11-12

## 2025-02-09 DIAGNOSIS — R14.1 ABDOMINAL GAS PAIN: ICD-10-CM

## 2025-02-11 RX ORDER — DICYCLOMINE HCL 20 MG
20 TABLET ORAL EVERY 6 HOURS
Qty: 30 TABLET | Refills: 0 | Status: SHIPPED | OUTPATIENT
Start: 2025-02-11

## 2025-04-03 ENCOUNTER — OFFICE VISIT (OUTPATIENT)
Dept: GASTROENTEROLOGY | Facility: CLINIC | Age: 25
End: 2025-04-03
Payer: COMMERCIAL

## 2025-04-03 VITALS
BODY MASS INDEX: 28.56 KG/M2 | HEIGHT: 65 IN | OXYGEN SATURATION: 99 % | HEART RATE: 85 BPM | DIASTOLIC BLOOD PRESSURE: 80 MMHG | SYSTOLIC BLOOD PRESSURE: 118 MMHG | WEIGHT: 171.4 LBS | TEMPERATURE: 97.6 F

## 2025-04-03 DIAGNOSIS — K21.9 GASTROESOPHAGEAL REFLUX DISEASE WITHOUT ESOPHAGITIS: ICD-10-CM

## 2025-04-03 DIAGNOSIS — R10.30 LOWER ABDOMINAL PAIN: ICD-10-CM

## 2025-04-03 DIAGNOSIS — R14.1 ABDOMINAL GAS PAIN: ICD-10-CM

## 2025-04-03 DIAGNOSIS — K58.0 IRRITABLE BOWEL SYNDROME WITH DIARRHEA: Primary | ICD-10-CM

## 2025-04-03 DIAGNOSIS — R19.7 DIARRHEA, UNSPECIFIED TYPE: ICD-10-CM

## 2025-04-03 PROCEDURE — 99214 OFFICE O/P EST MOD 30 MIN: CPT | Performed by: PHYSICIAN ASSISTANT

## 2025-04-03 RX ORDER — DICYCLOMINE HCL 20 MG
20 TABLET ORAL EVERY 6 HOURS
Qty: 120 TABLET | Refills: 2 | Status: SHIPPED | OUTPATIENT
Start: 2025-04-03 | End: 2025-04-03

## 2025-04-03 RX ORDER — DICYCLOMINE HCL 20 MG
20 TABLET ORAL EVERY 6 HOURS PRN
Qty: 120 TABLET | Refills: 2 | Status: SHIPPED | OUTPATIENT
Start: 2025-04-03

## 2025-04-03 NOTE — PROGRESS NOTES
Name: Kishor Talbot      : 2000      MRN: [id number]  Encounter Provider: Laura Nicole PA-C  Encounter Date: 4/3/2025   Encounter department: [facility] [place]  :  Assessment & Plan  Irritable bowel syndrome with diarrhea    Patient presents for follow up of his IBS-D.  He had a normal EGD and colonoscopy with visualization of the terminal ileum in  with negative bx for celiac and microscopic colitis.  He is doing well on Lomotil and Bentyl with about 1-2 bowel movements a day.    Continue current regimen.  We reviewed taking the least amount of the medications as needed for his symptoms.  Gastroesophageal reflux disease without esophagitis    He is on Pantoprazole 40mg po daily at this time without any breakthrough heartburn.    We reviewed a trial of weaning off the PPI if tolerated: he will go to every other day for 2-3 weeks and then every 2 days for a couple of weeks and stop if tolerated.    Follow up in 1 year or sooner if needed.      History of Present Illness   HPI  Kishor Talbot is a 25 y.o. male who presents to the office for follow up of his IBS-D and GERD.  He reports he is doing well Lomotil, Bentyl, and pantoprazole.  He reports about 1-2 bowel movements a day.  No significant abdominal pain no acid reflux symptoms.  He reports taking about 1 Lomotil a day.  He has not tried to wean off of the pantoprazole yet but is agreeable to try and see if he can come off of it.  No rectal bleeding.  No family history of colon cancer.  History obtained from: patient.      Medical History Reviewed by provider this encounter:  Meds     .  Past Medical History   Past Medical History:   Diagnosis Date    GERD (gastroesophageal reflux disease)      Past Surgical History:   Procedure Laterality Date    TONSILLECTOMY      TONSILLECTOMY       Family History   Problem Relation Age of Onset    Heart disease Mother     Cancer Father     Liver disease Brother       reports  "that he has never smoked. He has never used smokeless tobacco. He reports that he does not drink alcohol and does not use drugs.  Current Outpatient Medications   Medication Instructions    dicyclomine (BENTYL) 20 mg, Oral, Every 6 hours PRN    diphenoxylate-atropine (LOMOTIL) 2.5-0.025 mg per tablet TAKE 1 TABLET BY MOUTH 4 TIMES A DAY AS NEEDED FOR DIARRHEA    pantoprazole (PROTONIX) 40 mg tablet TAKE 1 TABLET BY MOUTH EVERY DAY    polymyxin b-trimethoprim (POLYTRIM) ophthalmic solution 1 drop, Left Eye, Every 4 hours    traZODone (DESYREL) 100 mg tablet TAKE 1 TABLET BY MOUTH EVERY DAY AT NIGHT   No Known Allergies   Current Outpatient Medications on File Prior to Visit   Medication Sig Dispense Refill    diphenoxylate-atropine (LOMOTIL) 2.5-0.025 mg per tablet TAKE 1 TABLET BY MOUTH 4 TIMES A DAY AS NEEDED FOR DIARRHEA 90 tablet 3    pantoprazole (PROTONIX) 40 mg tablet TAKE 1 TABLET BY MOUTH EVERY DAY 90 tablet 1    polymyxin b-trimethoprim (POLYTRIM) ophthalmic solution Administer 1 drop into the left eye every 4 (four) hours 10 mL 0    traZODone (DESYREL) 100 mg tablet TAKE 1 TABLET BY MOUTH EVERY DAY AT NIGHT      [DISCONTINUED] dicyclomine (BENTYL) 20 mg tablet TAKE 1 TABLET BY MOUTH EVERY 6 HOURS 30 tablet 0     No current facility-administered medications on file prior to visit.      Social History     Tobacco Use    Smoking status: Never    Smokeless tobacco: Never   Vaping Use    Vaping status: Never Used   Substance and Sexual Activity    Alcohol use: No    Drug use: Never    Sexual activity: Not on file        Objective   /80 (BP Location: Left arm, Patient Position: Sitting, Cuff Size: Standard)   Pulse 85   Temp 97.6 °F (36.4 °C) (Temporal)   Ht 5' 5\" (1.651 m)   Wt 77.7 kg (171 lb 6.4 oz)   SpO2 99%   BMI 28.52 kg/m²      Physical Exam  Constitutional:       General: He is not in acute distress.     Appearance: Normal appearance. He is not ill-appearing.   HENT:      Head: Normocephalic " and atraumatic.   Cardiovascular:      Rate and Rhythm: Normal rate and regular rhythm.   Pulmonary:      Effort: Pulmonary effort is normal. No respiratory distress.      Breath sounds: Normal breath sounds.   Skin:     General: Skin is warm and dry.   Neurological:      Mental Status: He is alert and oriented to person, place, and time.   Psychiatric:         Mood and Affect: Mood normal.         Behavior: Behavior normal.

## 2025-04-04 DIAGNOSIS — R14.1 ABDOMINAL GAS PAIN: ICD-10-CM

## 2025-04-04 DIAGNOSIS — R19.7 DIARRHEA, UNSPECIFIED TYPE: ICD-10-CM

## 2025-04-04 DIAGNOSIS — R10.30 LOWER ABDOMINAL PAIN: ICD-10-CM

## 2025-04-04 RX ORDER — PANTOPRAZOLE SODIUM 40 MG/1
40 TABLET, DELAYED RELEASE ORAL DAILY
Qty: 90 TABLET | Refills: 1 | Status: SHIPPED | OUTPATIENT
Start: 2025-04-04

## 2025-07-08 NOTE — TELEPHONE ENCOUNTER
LMOM for patient to phone back and schedule a fup appt for medication review.  
Patient needs a follow-up for further refills, thank you  
08-Jul-2025 13:59
08-Jul-2025 16:03